# Patient Record
Sex: FEMALE | Race: WHITE | NOT HISPANIC OR LATINO | Employment: UNEMPLOYED | URBAN - METROPOLITAN AREA
[De-identification: names, ages, dates, MRNs, and addresses within clinical notes are randomized per-mention and may not be internally consistent; named-entity substitution may affect disease eponyms.]

---

## 2017-01-10 ENCOUNTER — CLINICAL SUPPORT (OUTPATIENT)
Dept: SMOKING CESSATION | Facility: CLINIC | Age: 57
End: 2017-01-10
Payer: COMMERCIAL

## 2017-01-10 DIAGNOSIS — F17.200 NICOTINE DEPENDENCE: Primary | ICD-10-CM

## 2017-01-10 PROCEDURE — 99407 BEHAV CHNG SMOKING > 10 MIN: CPT | Mod: S$GLB,,,

## 2017-05-23 ENCOUNTER — HOSPITAL ENCOUNTER (EMERGENCY)
Facility: HOSPITAL | Age: 57
Discharge: HOME | End: 2017-05-23
Attending: FAMILY MEDICINE | Admitting: FAMILY MEDICINE

## 2017-05-23 VITALS
OXYGEN SATURATION: 100 % | DIASTOLIC BLOOD PRESSURE: 64 MMHG | BODY MASS INDEX: 26.46 KG/M2 | HEART RATE: 84 BPM | WEIGHT: 155 LBS | TEMPERATURE: 99 F | RESPIRATION RATE: 18 BRPM | HEIGHT: 64 IN | SYSTOLIC BLOOD PRESSURE: 146 MMHG

## 2017-05-23 DIAGNOSIS — R05.9 COUGH: ICD-10-CM

## 2017-05-23 DIAGNOSIS — J44.1 COPD EXACERBATION: Primary | ICD-10-CM

## 2017-05-23 DIAGNOSIS — F17.200 TOBACCO DEPENDENCE: ICD-10-CM

## 2017-05-23 PROCEDURE — 99284 EMERGENCY DEPT VISIT MOD MDM: CPT | Mod: ,,, | Performed by: PHYSICIAN ASSISTANT

## 2017-05-23 PROCEDURE — 63600175 PHARM REV CODE 636 W HCPCS: Performed by: PHYSICIAN ASSISTANT

## 2017-05-23 PROCEDURE — 25000003 PHARM REV CODE 250: Performed by: PHYSICIAN ASSISTANT

## 2017-05-23 PROCEDURE — 99283 EMERGENCY DEPT VISIT LOW MDM: CPT | Mod: 25

## 2017-05-23 RX ORDER — PREDNISONE 20 MG/1
40 TABLET ORAL DAILY
Qty: 8 TABLET | Refills: 0 | Status: SHIPPED | OUTPATIENT
Start: 2017-05-24 | End: 2017-05-28

## 2017-05-23 RX ORDER — AZITHROMYCIN 250 MG/1
250 TABLET, FILM COATED ORAL DAILY
Qty: 4 TABLET | Refills: 0 | Status: SHIPPED | OUTPATIENT
Start: 2017-05-24 | End: 2017-05-28

## 2017-05-23 RX ORDER — AZITHROMYCIN 250 MG/1
500 TABLET, FILM COATED ORAL
Status: COMPLETED | OUTPATIENT
Start: 2017-05-23 | End: 2017-05-23

## 2017-05-23 RX ORDER — PREDNISONE 20 MG/1
60 TABLET ORAL
Status: COMPLETED | OUTPATIENT
Start: 2017-05-23 | End: 2017-05-23

## 2017-05-23 RX ADMIN — PREDNISONE 60 MG: 20 TABLET ORAL at 01:05

## 2017-05-23 RX ADMIN — AZITHROMYCIN 500 MG: 250 TABLET, FILM COATED ORAL at 01:05

## 2017-05-23 NOTE — ED NOTES
Pt identifiers Sulma Alejandro were checked and correct  LOC: The patient is awake, alert, aware of environment with an appropriate affect. Oriented x3, speaking appropriately  APPEARANCE: Pt resting comfortably, in no acute distress, pt is clean and well groomed, clothing properly fastened  SKIN: Skin warm, dry and intact, normal skin turgor, moist mucus membranes  RESPIRATORY: Airway is open and patent, respirations are spontaneous, even and unlabored, normal effort and rate. Pt c/o productive cough which is green.   CARDIAC: Normal rate and rhythm, no peripheral edema noted, capillary refill < 3 seconds, bilateral radial pulses 2+  ABDOMEN: Soft, non tender, non distended. Bowel sounds present x 4 quadrants.    NEUROLOGIC: PERRLA, facial expression is symmetrical, patient moving all extremities spontaneously, normal sensation in all extremities when touched with a finger.  Follows all commands appropriately  MUSCULOSKELETAL: No obvious deformities.

## 2017-05-23 NOTE — ED NOTES
Pt presented to the ED c/o productive green cough x 2 weeks. Pt states she has been taking over the counter drugs for cough with no relief. Pt alert. Pt denies sob. Pt denies fever.

## 2017-05-23 NOTE — ED PROVIDER NOTES
Encounter Date: 2017       History     Chief Complaint   Patient presents with    Cough     productive cough, congestion      Review of patient's allergies indicates:   Allergen Reactions    Morphine Other (See Comments)     Patient is a 56-year-old female with a past medical history of tobacco abuse, COPD, IBS who presents the ED with cough.  Patient states that she has been coughing for the past 2 weeks with green/yellow productive sputum.  She reports no improvement in her symptoms with Mucinex, Afrin, Tussend DM.  She endorses subjective fever and night sweats.  She has associated chest pain, back pain, and SOB with her coughing spells, otherwise no chest pain or SOB at rest.  Patient smokes one and a half pack per day.  She denies any sick contacts.          Past Medical History:   Diagnosis Date    Arthritis     Arthropathy of lumbar facet joint 1/15/2013    COPD (chronic obstructive pulmonary disease) 10/22/2014    Irritable bowel syndrome     Thoracic or lumbosacral neuritis or radiculitis, unspecified 2012    Ulcer     Weight loss 2014     Past Surgical History:   Procedure Laterality Date    ABDOMINAL SURGERY      ADENOIDECTOMY      APPENDECTOMY  2013    CARPAL TUNNEL RELEASE Right      SECTION      CHOLECYSTECTOMY      HYSTERECTOMY      MALS repair      TONSILLECTOMY       Family History   Problem Relation Age of Onset    Diabetes Mother     Heart disease Mother     Cancer Father     Cancer Maternal Grandmother     Heart disease Brother     Cancer Paternal Grandfather      Social History   Substance Use Topics    Smoking status: Current Every Day Smoker     Packs/day: 0.50     Years: 40.00     Types: Cigarettes    Smokeless tobacco: Never Used    Alcohol use No     Review of Systems   Constitutional: Positive for diaphoresis and fever.   HENT: Negative for facial swelling and sneezing.    Eyes: Negative for visual disturbance.   Respiratory: Positive  for cough and shortness of breath.    Cardiovascular: Positive for chest pain.   Gastrointestinal: Negative for abdominal pain, diarrhea and vomiting.   Endocrine: Negative for polyuria.   Genitourinary: Negative for dysuria, hematuria and urgency.   Musculoskeletal: Negative for gait problem and joint swelling.   Skin: Negative for pallor.   Neurological: Negative for syncope and headaches.       Physical Exam     Initial Vitals [05/23/17 1016]   BP Pulse Resp Temp SpO2   (!) 146/64 84 18 99.4 °F (37.4 °C) 100 %     Physical Exam    Vitals reviewed.  Constitutional: She appears well-developed and well-nourished. She is not diaphoretic. No distress.   HENT:   Head: Normocephalic and atraumatic.   Nose: Nose normal.   Eyes: Conjunctivae and EOM are normal.   Neck: Normal range of motion.   Cardiovascular: Normal rate, regular rhythm and normal heart sounds. Exam reveals no friction rub.    No murmur heard.  Pulmonary/Chest: No respiratory distress. She has no wheezes. She has no rales.   Coarse breath sounds in ashwini lower lung fields. Non productive coughing throughout exam.   Abdominal: Soft. Bowel sounds are normal. She exhibits no distension. There is no tenderness. There is no rebound.   Musculoskeletal: Normal range of motion.   Neurological: She is alert and oriented to person, place, and time. She has normal strength. No sensory deficit.   Skin: Skin is warm and dry. No erythema. No pallor.   Psychiatric: She has a normal mood and affect. Her behavior is normal. Judgment and thought content normal.         ED Course   Procedures  Labs Reviewed - No data to display          Medical Decision Making:   History:   Old Medical Records: I decided to obtain old medical records.  Clinical Tests:   Radiological Study: Ordered and Reviewed       APC / Resident Notes:   On exam, afebrile.  Satting at 100%.  Coarse breath sounds bilaterally in lower lung fields.  No use of accessory muscles.  Speaking in full sentences.   She is coughing throughout exam.  DDX includes but is not limited to pneumonia, viral URI syndrome, COPD exacerbation.  Will order chest x-ray and reassess.    CXR shows severe emphysema.     Will treat for COPD with prednisone and azithromycin. Consider smoking cessation.Patient to follow up with PCP. Strict ED return precaution given for new or worsening symptoms. Patient voiced understanding. All questions answered.  Patient comfortable with plan and stable for discharge. I have reviewed patient's chart and imaging and discuss this case with my supervising MD.                ED Course     Clinical Impression:   The primary encounter diagnosis was COPD exacerbation. A diagnosis of Cough was also pertinent to this visit.    Disposition:   Disposition: Discharged  Condition: Stable       Lynsey Gomez PA-C  05/24/17 [id number]

## 2017-05-23 NOTE — DISCHARGE INSTRUCTIONS
Take steroids and antibiotics for the next 4 days starting tomorrow to complete a 5-day course.  AVEL gregg consider smoking cessation.    Follow-up with your primary care physician if your symptoms do not improve or worsen or return to the emergency department.    No future appointments.

## 2019-03-14 ENCOUNTER — OFFICE VISIT (OUTPATIENT)
Dept: URGENT CARE | Facility: CLINIC | Age: 59
End: 2019-03-14
Payer: COMMERCIAL

## 2019-03-14 VITALS
BODY MASS INDEX: 22.2 KG/M2 | WEIGHT: 130 LBS | OXYGEN SATURATION: 100 % | SYSTOLIC BLOOD PRESSURE: 132 MMHG | TEMPERATURE: 97 F | DIASTOLIC BLOOD PRESSURE: 78 MMHG | HEART RATE: 52 BPM | HEIGHT: 64 IN

## 2019-03-14 DIAGNOSIS — J01.90 ACUTE BACTERIAL SINUSITIS: Primary | ICD-10-CM

## 2019-03-14 DIAGNOSIS — J20.9 ACUTE PURULENT BRONCHITIS: ICD-10-CM

## 2019-03-14 DIAGNOSIS — B96.89 ACUTE BACTERIAL SINUSITIS: Primary | ICD-10-CM

## 2019-03-14 PROCEDURE — 96372 THER/PROPH/DIAG INJ SC/IM: CPT | Mod: S$GLB,,, | Performed by: INTERNAL MEDICINE

## 2019-03-14 PROCEDURE — 3008F PR BODY MASS INDEX (BMI) DOCUMENTED: ICD-10-PCS | Mod: CPTII,S$GLB,, | Performed by: INTERNAL MEDICINE

## 2019-03-14 PROCEDURE — 99214 OFFICE O/P EST MOD 30 MIN: CPT | Mod: 25,S$GLB,, | Performed by: INTERNAL MEDICINE

## 2019-03-14 PROCEDURE — 3008F BODY MASS INDEX DOCD: CPT | Mod: CPTII,S$GLB,, | Performed by: INTERNAL MEDICINE

## 2019-03-14 PROCEDURE — 99214 PR OFFICE/OUTPT VISIT, EST, LEVL IV, 30-39 MIN: ICD-10-PCS | Mod: 25,S$GLB,, | Performed by: INTERNAL MEDICINE

## 2019-03-14 PROCEDURE — 96372 PR INJECTION,THERAP/PROPH/DIAG2ST, IM OR SUBCUT: ICD-10-PCS | Mod: S$GLB,,, | Performed by: INTERNAL MEDICINE

## 2019-03-14 RX ORDER — BETAMETHASONE SODIUM PHOSPHATE AND BETAMETHASONE ACETATE 3; 3 MG/ML; MG/ML
9 INJECTION, SUSPENSION INTRA-ARTICULAR; INTRALESIONAL; INTRAMUSCULAR; SOFT TISSUE ONCE
Status: COMPLETED | OUTPATIENT
Start: 2019-03-14 | End: 2019-03-14

## 2019-03-14 RX ORDER — AMOXICILLIN AND CLAVULANATE POTASSIUM 875; 125 MG/1; MG/1
1 TABLET, FILM COATED ORAL EVERY 12 HOURS
Qty: 20 TABLET | Refills: 0 | Status: SHIPPED | OUTPATIENT
Start: 2019-03-14 | End: 2019-03-24

## 2019-03-14 RX ADMIN — BETAMETHASONE SODIUM PHOSPHATE AND BETAMETHASONE ACETATE 9 MG: 3; 3 INJECTION, SUSPENSION INTRA-ARTICULAR; INTRALESIONAL; INTRAMUSCULAR; SOFT TISSUE at 09:03

## 2019-03-14 NOTE — PROGRESS NOTES
"Subjective:       Patient ID: Sulma Alejandro is a 58 y.o. female.    Vitals:  height is 5' 4" (1.626 m) and weight is 59 kg (130 lb).     Chief Complaint: Sinusitis    Sinusitis   This is a new problem. Episode onset: 4 days ago. The problem has been gradually worsening since onset. There has been no fever. Her pain is at a severity of 5/10. The pain is moderate. Associated symptoms include chills, ear pain, sinus pressure and sneezing. Pertinent negatives include no congestion, coughing, diaphoresis, shortness of breath or sore throat. Past treatments include nothing. The treatment provided no relief.       Constitution: Positive for chills. Negative for sweating, fatigue and fever.   HENT: Positive for ear pain and sinus pressure. Negative for congestion, sinus pain, sore throat and voice change.    Neck: Negative for painful lymph nodes.   Eyes: Negative for eye redness.   Respiratory: Negative for chest tightness, cough, sputum production, bloody sputum, COPD, shortness of breath, stridor, wheezing and asthma.    Gastrointestinal: Negative for nausea and vomiting.   Musculoskeletal: Negative for muscle ache.   Skin: Negative for rash.   Allergic/Immunologic: Positive for sneezing. Negative for seasonal allergies and asthma.   Hematologic/Lymphatic: Negative for swollen lymph nodes.       Objective:      Physical Exam   Constitutional: She appears well-developed and well-nourished.   HENT:   Head: Normocephalic and atraumatic.   Nose: Mucosal edema (bloody,purulent mucous) present. Right sinus exhibits maxillary sinus tenderness. Left sinus exhibits maxillary sinus tenderness.   Eyes: Conjunctivae and EOM are normal. Pupils are equal, round, and reactive to light.   Neck: Normal range of motion. Neck supple.   Cardiovascular: Normal rate and regular rhythm.   Pulmonary/Chest: Effort normal.   Upper congestion   Nursing note and vitals reviewed.      Assessment:       No diagnosis found.    Plan:         There are " no diagnoses linked to this encounter.

## 2019-03-14 NOTE — LETTER
March 14, 2019      [facility] [place]  [address]  [place] [address]  Phone: [phone]  Fax: [phone]       Patient: Sulma Alejandro   YOB: 1960  Date of Visit: 03/14/2019    To Whom It May Concern:    Meliton Alejandro  was at [facility] on 03/14/2019. She may return to work/school on 3/15/2019 with no restrictions. If you have any questions or concerns, or if I can be of further assistance, please do not hesitate to contact me.    Sincerely,    Kannan Infante MD